# Patient Record
Sex: FEMALE | Race: WHITE | NOT HISPANIC OR LATINO | Employment: UNEMPLOYED | URBAN - NONMETROPOLITAN AREA
[De-identification: names, ages, dates, MRNs, and addresses within clinical notes are randomized per-mention and may not be internally consistent; named-entity substitution may affect disease eponyms.]

---

## 2018-08-30 ENCOUNTER — HOSPITAL ENCOUNTER (OUTPATIENT)
Dept: CT IMAGING | Facility: HOSPITAL | Age: 28
Discharge: HOME | End: 2018-08-30
Attending: UROLOGY | Admitting: UROLOGY
Payer: MEDICAID

## 2018-08-30 DIAGNOSIS — N20.0 KIDNEY STONE: ICD-10-CM

## 2018-08-30 PROCEDURE — 74176 CT ABD & PELVIS W/O CONTRAST: CPT | Mod: TC

## 2018-09-03 ENCOUNTER — APPOINTMENT (OUTPATIENT)
Dept: ULTRASOUND IMAGING | Facility: HOSPITAL | Age: 28
End: 2018-09-03
Attending: PHYSICIAN ASSISTANT
Payer: MEDICAID

## 2018-09-03 ENCOUNTER — HOSPITAL ENCOUNTER (EMERGENCY)
Facility: HOSPITAL | Age: 28
Discharge: HOME | End: 2018-09-03
Attending: PHYSICIAN ASSISTANT | Admitting: PHYSICIAN ASSISTANT
Payer: MEDICAID

## 2018-09-03 VITALS
TEMPERATURE: 98.4 F | HEART RATE: 95 BPM | DIASTOLIC BLOOD PRESSURE: 66 MMHG | OXYGEN SATURATION: 95 % | RESPIRATION RATE: 16 BRPM | SYSTOLIC BLOOD PRESSURE: 105 MMHG

## 2018-09-03 DIAGNOSIS — N73.0 PID (ACUTE PELVIC INFLAMMATORY DISEASE): ICD-10-CM

## 2018-09-03 LAB
ALBUMIN SERPL-MCNC: 3.8 G/DL (ref 3.4–5)
ALBUMIN UR-MCNC: NEGATIVE MG/DL
ALP SERPL-CCNC: 59 U/L (ref 40–150)
ALT SERPL W P-5'-P-CCNC: 27 U/L (ref 0–50)
ANION GAP SERPL CALCULATED.3IONS-SCNC: 11 MMOL/L (ref 3–14)
APPEARANCE UR: CLEAR
AST SERPL W P-5'-P-CCNC: 22 U/L (ref 0–45)
BACTERIA #/AREA URNS HPF: ABNORMAL /HPF
BASOPHILS # BLD AUTO: 0 10E9/L (ref 0–0.2)
BASOPHILS NFR BLD AUTO: 0.2 %
BILIRUB SERPL-MCNC: 0.3 MG/DL (ref 0.2–1.3)
BILIRUB UR QL STRIP: NEGATIVE
BUN SERPL-MCNC: 16 MG/DL (ref 7–30)
C TRACH DNA SPEC QL PROBE+SIG AMP: ABNORMAL
C TRACH DNA SPEC QL PROBE+SIG AMP: NOT DETECTED
CALCIUM SERPL-MCNC: 9.1 MG/DL (ref 8.5–10.1)
CHLORIDE SERPL-SCNC: 103 MMOL/L (ref 94–109)
CO2 SERPL-SCNC: 25 MMOL/L (ref 20–32)
COLOR UR AUTO: ABNORMAL
CREAT SERPL-MCNC: 1.09 MG/DL (ref 0.52–1.04)
CRP SERPL-MCNC: 28.1 MG/L (ref 0–8)
DIFFERENTIAL METHOD BLD: NORMAL
EOSINOPHIL # BLD AUTO: 0 10E9/L (ref 0–0.7)
EOSINOPHIL NFR BLD AUTO: 0.4 %
ERYTHROCYTE [DISTWIDTH] IN BLOOD BY AUTOMATED COUNT: 12.5 % (ref 10–15)
GFR SERPL CREATININE-BSD FRML MDRD: 60 ML/MIN/1.7M2
GLUCOSE SERPL-MCNC: 87 MG/DL (ref 70–99)
GLUCOSE UR STRIP-MCNC: NEGATIVE MG/DL
HCG UR QL: NEGATIVE
HCT VFR BLD AUTO: 41.7 % (ref 35–47)
HGB BLD-MCNC: 14.1 G/DL (ref 11.7–15.7)
HGB UR QL STRIP: ABNORMAL
IMM GRANULOCYTES # BLD: 0 10E9/L (ref 0–0.4)
IMM GRANULOCYTES NFR BLD: 0.2 %
KETONES UR STRIP-MCNC: NEGATIVE MG/DL
LEUKOCYTE ESTERASE UR QL STRIP: NEGATIVE
LYMPHOCYTES # BLD AUTO: 1.4 10E9/L (ref 0.8–5.3)
LYMPHOCYTES NFR BLD AUTO: 14.1 %
MCH RBC QN AUTO: 29.8 PG (ref 26.5–33)
MCHC RBC AUTO-ENTMCNC: 33.8 G/DL (ref 31.5–36.5)
MCV RBC AUTO: 88 FL (ref 78–100)
MONOCYTES # BLD AUTO: 0.6 10E9/L (ref 0–1.3)
MONOCYTES NFR BLD AUTO: 6.4 %
MUCOUS THREADS #/AREA URNS LPF: PRESENT /LPF
N GONORRHOEA DNA SPEC QL PROBE+SIG AMP: ABNORMAL
N GONORRHOEA DNA SPEC QL PROBE+SIG AMP: NOT DETECTED
NEUTROPHILS # BLD AUTO: 7.5 10E9/L (ref 1.6–8.3)
NEUTROPHILS NFR BLD AUTO: 78.7 %
NITRATE UR QL: NEGATIVE
NRBC # BLD AUTO: 0 10*3/UL
NRBC BLD AUTO-RTO: 0 /100
PH UR STRIP: 6.5 PH (ref 4.7–8)
PLATELET # BLD AUTO: 319 10E9/L (ref 150–450)
POTASSIUM SERPL-SCNC: 3.8 MMOL/L (ref 3.4–5.3)
PROT SERPL-MCNC: 8.5 G/DL (ref 6.8–8.8)
RBC # BLD AUTO: 4.73 10E12/L (ref 3.8–5.2)
RBC #/AREA URNS AUTO: 1 /HPF (ref 0–2)
SODIUM SERPL-SCNC: 139 MMOL/L (ref 133–144)
SOURCE: ABNORMAL
SP GR UR STRIP: 1 (ref 1–1.03)
SPECIMEN SOURCE: ABNORMAL
SPECIMEN SOURCE: NORMAL
SPECIMEN SOURCE: NORMAL
UROBILINOGEN UR STRIP-MCNC: NORMAL MG/DL (ref 0–2)
WBC # BLD AUTO: 9.6 10E9/L (ref 4–11)
WBC #/AREA URNS AUTO: <1 /HPF (ref 0–5)
WET PREP SPEC: NORMAL

## 2018-09-03 PROCEDURE — 25000132 ZZH RX MED GY IP 250 OP 250 PS 637: Performed by: PHYSICIAN ASSISTANT

## 2018-09-03 PROCEDURE — 87210 SMEAR WET MOUNT SALINE/INK: CPT | Performed by: PHYSICIAN ASSISTANT

## 2018-09-03 PROCEDURE — 81001 URINALYSIS AUTO W/SCOPE: CPT | Performed by: PHYSICIAN ASSISTANT

## 2018-09-03 PROCEDURE — 80053 COMPREHEN METABOLIC PANEL: CPT | Performed by: PHYSICIAN ASSISTANT

## 2018-09-03 PROCEDURE — 96372 THER/PROPH/DIAG INJ SC/IM: CPT

## 2018-09-03 PROCEDURE — 36415 COLL VENOUS BLD VENIPUNCTURE: CPT | Performed by: PHYSICIAN ASSISTANT

## 2018-09-03 PROCEDURE — 81025 URINE PREGNANCY TEST: CPT | Performed by: PHYSICIAN ASSISTANT

## 2018-09-03 PROCEDURE — 86140 C-REACTIVE PROTEIN: CPT | Performed by: PHYSICIAN ASSISTANT

## 2018-09-03 PROCEDURE — 87591 N.GONORRHOEAE DNA AMP PROB: CPT | Performed by: PHYSICIAN ASSISTANT

## 2018-09-03 PROCEDURE — 76856 US EXAM PELVIC COMPLETE: CPT | Mod: TC

## 2018-09-03 PROCEDURE — 85025 COMPLETE CBC W/AUTO DIFF WBC: CPT | Performed by: PHYSICIAN ASSISTANT

## 2018-09-03 PROCEDURE — 99285 EMERGENCY DEPT VISIT HI MDM: CPT | Mod: 25

## 2018-09-03 PROCEDURE — 25000128 H RX IP 250 OP 636: Performed by: PHYSICIAN ASSISTANT

## 2018-09-03 PROCEDURE — 99285 EMERGENCY DEPT VISIT HI MDM: CPT | Performed by: PHYSICIAN ASSISTANT

## 2018-09-03 PROCEDURE — 25000125 ZZHC RX 250: Performed by: PHYSICIAN ASSISTANT

## 2018-09-03 PROCEDURE — 87491 CHLMYD TRACH DNA AMP PROBE: CPT | Performed by: PHYSICIAN ASSISTANT

## 2018-09-03 RX ORDER — TRAZODONE HYDROCHLORIDE 50 MG/1
100 TABLET, FILM COATED ORAL
COMMUNITY
Start: 2018-07-31

## 2018-09-03 RX ORDER — ACETAMINOPHEN 500 MG
1000 TABLET ORAL
COMMUNITY
Start: 2015-05-29

## 2018-09-03 RX ORDER — GABAPENTIN 300 MG/1
CAPSULE ORAL
COMMUNITY
Start: 2018-08-13

## 2018-09-03 RX ORDER — KETOROLAC TROMETHAMINE 30 MG/ML
60 INJECTION, SOLUTION INTRAMUSCULAR; INTRAVENOUS ONCE
Status: COMPLETED | OUTPATIENT
Start: 2018-09-03 | End: 2018-09-03

## 2018-09-03 RX ORDER — CEFTRIAXONE SODIUM 1 G
250 VIAL (EA) INJECTION ONCE
Status: COMPLETED | OUTPATIENT
Start: 2018-09-03 | End: 2018-09-03

## 2018-09-03 RX ORDER — CLONAZEPAM 0.5 MG/1
0.5 TABLET ORAL
COMMUNITY
Start: 2018-07-31

## 2018-09-03 RX ORDER — DOXYCYCLINE 100 MG/1
100 CAPSULE ORAL 2 TIMES DAILY
Qty: 28 CAPSULE | Refills: 0 | Status: SHIPPED | OUTPATIENT
Start: 2018-09-03 | End: 2018-09-17

## 2018-09-03 RX ORDER — KETOROLAC TROMETHAMINE 10 MG/1
10 TABLET, FILM COATED ORAL EVERY 6 HOURS PRN
Qty: 20 TABLET | Refills: 0 | Status: SHIPPED | OUTPATIENT
Start: 2018-09-03

## 2018-09-03 RX ORDER — NORGESTIMATE AND ETHINYL ESTRADIOL 0.25-0.035
1 KIT ORAL DAILY
COMMUNITY
Start: 2018-07-31

## 2018-09-03 RX ORDER — LIDOCAINE HYDROCHLORIDE 10 MG/ML
INJECTION, SOLUTION EPIDURAL; INFILTRATION; INTRACAUDAL; PERINEURAL
Status: DISCONTINUED
Start: 2018-09-03 | End: 2018-09-03 | Stop reason: HOSPADM

## 2018-09-03 RX ORDER — ONDANSETRON 4 MG/1
4 TABLET, ORALLY DISINTEGRATING ORAL EVERY 8 HOURS PRN
Qty: 10 TABLET | Refills: 0 | Status: SHIPPED | OUTPATIENT
Start: 2018-09-03 | End: 2018-09-06

## 2018-09-03 RX ORDER — OMEPRAZOLE 40 MG/1
40 CAPSULE, DELAYED RELEASE ORAL DAILY
COMMUNITY
Start: 2018-08-01

## 2018-09-03 RX ORDER — ONDANSETRON 4 MG/1
4 TABLET, ORALLY DISINTEGRATING ORAL ONCE
Status: COMPLETED | OUTPATIENT
Start: 2018-09-03 | End: 2018-09-03

## 2018-09-03 RX ORDER — ALBUTEROL SULFATE 90 UG/1
2 AEROSOL, METERED RESPIRATORY (INHALATION) EVERY 4 HOURS PRN
COMMUNITY
Start: 2017-01-03

## 2018-09-03 RX ORDER — OXYBUTYNIN CHLORIDE 5 MG/1
5 TABLET ORAL 2 TIMES DAILY
COMMUNITY
Start: 2018-08-17

## 2018-09-03 RX ORDER — DOXYCYCLINE HYCLATE 100 MG
100 TABLET ORAL ONCE
Status: COMPLETED | OUTPATIENT
Start: 2018-09-03 | End: 2018-09-03

## 2018-09-03 RX ORDER — LISINOPRIL/HYDROCHLOROTHIAZIDE 10-12.5 MG
1 TABLET ORAL DAILY
COMMUNITY
Start: 2018-07-31

## 2018-09-03 RX ADMIN — CEFTRIAXONE 250 MG: 1 INJECTION, POWDER, FOR SOLUTION INTRAMUSCULAR; INTRAVENOUS at 17:13

## 2018-09-03 RX ADMIN — DOXYCYCLINE HYCLATE 100 MG: 100 TABLET, FILM COATED ORAL at 17:11

## 2018-09-03 RX ADMIN — ONDANSETRON 4 MG: 4 TABLET, ORALLY DISINTEGRATING ORAL at 17:11

## 2018-09-03 RX ADMIN — KETOROLAC TROMETHAMINE 60 MG: 30 INJECTION, SOLUTION INTRAMUSCULAR at 17:13

## 2018-09-03 ASSESSMENT — ENCOUNTER SYMPTOMS
SORE THROAT: 0
ABDOMINAL DISTENTION: 0
APPETITE CHANGE: 0
MUSCULOSKELETAL NEGATIVE: 1
DIFFICULTY URINATING: 0
BLOOD IN STOOL: 0
CONSTIPATION: 0
ANAL BLEEDING: 0
DIARRHEA: 0
UNEXPECTED WEIGHT CHANGE: 0
NEUROLOGICAL NEGATIVE: 1
ABDOMINAL PAIN: 0
HEMATURIA: 0
VOMITING: 0
SHORTNESS OF BREATH: 0
CHILLS: 0
DYSURIA: 0
FREQUENCY: 0
FEVER: 0
NAUSEA: 0
FLANK PAIN: 0

## 2018-09-03 NOTE — ED NOTES
Pt to room 6 of the ED by self. Pt is On sprintec birth control, missed one pill, but is supposed to get her period every 3 months. Had breakthrough bleeding at beginning of month and now just randomly bleeding. Pt now reports she is also having red bloody discharge from rectum. Pt does not know if she is pregnant. Pt also gaining unexplainable weight of 50 lbs over the last year, but thinks it might be her birth control. Call light in reach.

## 2018-09-03 NOTE — ED AVS SNAPSHOT
[facility]    [address] [address]    Phone:  [phone]                                       Jeannette Maria   MRN: [id number]    Department:  [facility]   Date of Visit:  9/3/2018           Patient Information     Date Of Birth          1990        Your diagnoses for this visit were:     PID (acute pelvic inflammatory disease)        You were seen by Akiko Montiel PA-C.      Follow-up Information     Follow up with Josie Rivero In 3 days.    Specialty:  Nurse Practitioner    Contact information:    [facility]  [address] [facility] [id number] [phone]          Follow up with [facility].    Specialty:  EMERGENCY MEDICINE    Why:  If symptoms worsen    Contact information:    [address]  [facility] [address] [phone]    Additional information:    From [facility]: Take [address]. Turn left at [address]. Turn left at the first stoplight on [address]. At the first stop sign, take a right onto [address]. Take a left into the parking lot and continue through until you reach the North enterance of the building.       From [place]: Take [address]. Take the [address] ramp towards [place]. Turn left onto [address]. Take a slight right onto [address]. Turn left at the first stoplight on [address]. At the first stop sign, take a right onto [address]. Take a left into the parking lot and continue through until you reach the North enterance of the building.       From [place]: Take [address]. Take a right at [address]. At the first stop sign, take a right onto [address]. Take a left into the parking lot and continue through until you reach the North enterance of the building.         Discharge Instructions       Take the Doxycycline as prescribed for your pelvic infection. You STD testing came back normal. Please  follow up with OB this week for re-check. It is possible you may have endometriosis but this can be very hard to diagnose. Take the Toradol as prescribed for pain. Take the Zofran as prescribed for nausea. Please return here with any new or worsening symptoms.     Treating Pelvic Inflammatory Disease (PID) with Medicines     Be sure to take your antibiotics every day until they re finished.   As soon as pelvic inflammatory disease (PID) has been diagnosed, it needs to be treated with antibiotics. Two or more types of antibiotics may be taken at the same time. This ensures that all the bacteria are killed. It's very important to take all of your medicine as prescribed, or the infection may not go away.  For mild cases  Mild cases of PID can be treated at home. Antibiotic pills will likely be prescribed. You may also get an injection of antibiotics.  For more serious cases  Severe cases of PID need to be treated in the hospital. There, you will get antibiotics through an IV (intravenous) line. Your health will be closely watched. The length of your hospital stay depends on how sick you are. After you leave the hospital, antibiotic pills will likely be prescribed. If complications have occurred, you may need surgery to help treat them.  Make sure your partner gets treated  The bacteria that cause PID can also have harmful effects in men. Any partner you have had sex with in the past 60 days should be treated for chlamydia and gonorrhea. Your partner must be treated with antibiotics as well.  During treatment  Follow all your healthcare provider s instructions. This will help you heal. During treatment:    Finish all of your antibiotics, even if you start to feel better. Otherwise, the infection might not go away. It could even get worse and become harder to treat.    Don t have sex until both you and your partner have finished all of your antibiotics.    Relieve pelvic pain with a heating pad, hot water bottle, or ice  pack. Your healthcare provider may also suggest a prescription or over-the-counter pain medicine.    Ask your healthcare provider if you should avoid alcohol, which can react with some antibiotics.  If you take birth control pills  When both you and your partner have finished your antibiotics, it s OK to have sex. Use latex condoms to prevent future infections. Also, keep taking your birth control pills on schedule. Remember: Birth control pills help prevent pregnancy, but do not protect against sexually transmitted infections (STIs).  If the infection returns  Even after treatment, PID can come back. This could happen if you re infected by another STI. But be aware that once you ve had PID, bacteria that are normally harmless may be more likely to infect your upper genital tract. This means you could get PID again even without getting another STI. With each PID infection, the chances of complications go up.  When to call your healthcare provider  While you re being treated for PID, call your healthcare provider if you have any of the following:    A fever of 100.4 F (38 C) or higher, or as directed by your healthcare provider    Worsening pelvic pain    Vomiting    A rash    Severe diarrhea   Date Last Reviewed: 10/1/2017    7648-2091 [employer]. [address] [place] [address]. All rights reserved. This information is not intended as a substitute for professional medical care. Always follow your healthcare professional's instructions.             Review of your medicines      START taking        Dose / Directions Last dose taken    doxycycline 100 MG capsule   Commonly known as:  VIBRAMYCIN   Dose:  100 mg   Quantity:  28 capsule        Take 1 capsule (100 mg) by mouth 2 times daily for 14 days   Refills:  0        ketorolac 10 MG tablet   Commonly known as:  TORADOL   Dose:  10 mg   Quantity:  20 tablet        Take 1 tablet (10 mg) by mouth every 6 hours as needed for moderate pain    Refills:  0        ondansetron 4 MG ODT tab   Commonly known as:  ZOFRAN ODT   Dose:  4 mg   Quantity:  10 tablet        Take 1 tablet (4 mg) by mouth every 8 hours as needed for nausea   Refills:  0          Our records show that you are taking the medicines listed below. If these are incorrect, please call your family doctor or clinic.        Dose / Directions Last dose taken    acetaminophen 500 MG tablet   Commonly known as:  TYLENOL   Dose:  1000 mg        Take 1,000 mg by mouth   Refills:  0        albuterol 108 (90 Base) MCG/ACT inhaler   Commonly known as:  PROAIR HFA/PROVENTIL HFA/VENTOLIN HFA   Dose:  2 puff        2 puffs every 4 hours as needed   Refills:  0        clonazePAM 0.5 MG tablet   Commonly known as:  klonoPIN   Dose:  0.5 mg        Take 0.5 mg by mouth   Refills:  0        gabapentin 300 MG capsule   Commonly known as:  NEURONTIN        Take two tablets in the AM, one in the afternoon and three at bedtime   Refills:  0        lisinopril-hydrochlorothiazide 10-12.5 MG per tablet   Commonly known as:  PRINZIDE/ZESTORETIC   Dose:  1 tablet        Take 1 tablet by mouth daily   Refills:  0        norgestimate-ethinyl estradiol 0.25-35 MG-MCG per tablet   Commonly known as:  ORTHO-CYCLEN, SPRINTEC   Dose:  1 tablet        Take 1 tablet by mouth daily   Refills:  0        omeprazole 40 MG capsule   Commonly known as:  priLOSEC   Dose:  40 mg        Take 40 mg by mouth daily   Refills:  0        oxybutynin 5 MG tablet   Commonly known as:  DITROPAN   Dose:  5 mg        Take 5 mg by mouth 2 times daily   Refills:  0        traZODone 50 MG tablet   Commonly known as:  DESYREL   Dose:  100 mg        Take 100 mg by mouth nightly as needed   Refills:  0                Prescriptions were sent or printed at these locations (3 Prescriptions)                   [id number] - [place] - [address] [address] [facility] [address] [address] [place] [address]    Telephone:  [phone]   Fax:  [phone]   Hours:                   E-Prescribed (3 of 3)         doxycycline (VIBRAMYCIN) 100 MG capsule               ketorolac (TORADOL) 10 MG tablet               ondansetron (ZOFRAN ODT) 4 MG ODT tab                Procedures and tests performed during your visit     Procedure/Test Number of Times Performed    CBC with platelets differential 1    CRP inflammation 1    Comprehensive metabolic panel 1    GC/Chlamydia by PCR - [facility] 2    HCG qualitative urine 1    UA reflex to Microscopic and Culture 1    US Pelvic Complete with Transvaginal 1    Wet prep 1      Orders Needing Specimen Collection     None      Pending Results     No orders found from 9/1/2018 to 9/4/2018.            Pending Culture Results     No orders found from 9/1/2018 to 9/4/2018.            Thank you for choosing [facility]       Thank you for choosing [facility] for your care. Our goal is always to provide you with excellent care. Hearing back from our patients is one way we can continue to improve our services. Please take a few minutes to complete the written survey that you may receive in the mail after you visit with us. Thank you!        Care EveryWhere ID     This is your Care EveryWhere ID. This could be used by other organizations to access your [facility] medical records  [id number]        Equal Access to Services     CHARLY LAUREN : Hadazar Abraham, cade anton, kiesha dozier. So [facility] [phone].    ATENCIÓN: Si habla español, tiene a galvan disposición servicios gratuitos de asistencia lingüística. Llame al [phone].    We comply with applicable federal civil rights laws and [place] laws. We do not discriminate on the basis of race, color, national origin, age, disability, sex, sexual orientation, or gender identity.            After Visit Summary       This is your record. Keep this with you and show to your community pharmacist(s) and doctor(s) at your next visit.

## 2018-09-03 NOTE — ED NOTES
"Patient presents today with concerns she may be pregnant.  Patient states she had what she thought was a normal period in July and then her August she had \"break through\" bleeding and different then normal period.  Patient states she takes the pill and swears she only missed one tablet of BC.  Also notes she has been having rectal bleeding unsure of duration.  Also states she has lower abdominal pain cramping/pressure sensation.  States \"I don't have money to buy a pregnancy test\"  "

## 2018-09-03 NOTE — ED PROVIDER NOTES
History     Chief Complaint   Patient presents with     Vaginal Bleeding     Rectal Bleeding     HPI  Jeannette Maria is a 28 year old female who presents with light vaginal bleeding and lower abdominal cramping x 1 month. LMP August 1st. She takes Ortho-trycyclen birth control regularly. She is sexually active with one partner and does not use condoms. She has family h/o endometriosis and is wondering if she may have this. She is also concerned she may be pregnant but could not afford a pregnancy test.     Problem List:    There are no active problems to display for this patient.       Past Medical History:    History reviewed. No pertinent past medical history.    Past Surgical History:    History reviewed. No pertinent surgical history.    Family History:    No family history on file.    Social History:  Marital Status:  Single [1]  Social History   Substance Use Topics     Smoking status: Current Every Day Smoker     Packs/day: 0.50     Smokeless tobacco: Never Used     Alcohol use Yes      Comment: occasional        Medications:      acetaminophen (TYLENOL) 500 MG tablet   clonazePAM (KLONOPIN) 0.5 MG tablet   doxycycline (VIBRAMYCIN) 100 MG capsule   gabapentin (NEURONTIN) 300 MG capsule   ketorolac (TORADOL) 10 MG tablet   lisinopril-hydrochlorothiazide (PRINZIDE/ZESTORETIC) 10-12.5 MG per tablet   norgestimate-ethinyl estradiol (ORTHO-CYCLEN, SPRINTEC) 0.25-35 MG-MCG per tablet   omeprazole (PRILOSEC) 40 MG capsule   ondansetron (ZOFRAN ODT) 4 MG ODT tab   oxybutynin (DITROPAN) 5 MG tablet   traZODone (DESYREL) 50 MG tablet   albuterol (PROAIR HFA/PROVENTIL HFA/VENTOLIN HFA) 108 (90 Base) MCG/ACT inhaler         Review of Systems   Constitutional: Negative for appetite change, chills, fever and unexpected weight change.   HENT: Negative for sore throat.    Respiratory: Negative for shortness of breath.    Cardiovascular: Negative for chest pain.   Gastrointestinal: Negative for abdominal distention,  abdominal pain, anal bleeding, blood in stool, constipation, diarrhea, nausea and vomiting.   Genitourinary: Positive for pelvic pain and vaginal bleeding. Negative for difficulty urinating, dyspareunia, dysuria, flank pain, frequency, genital sores, hematuria, urgency, vaginal discharge and vaginal pain.   Musculoskeletal: Negative.    Skin: Negative.    Neurological: Negative.    All other systems reviewed and are negative.      Physical Exam   BP: 140/62  Pulse: 95  Heart Rate: 98  Temp: 99.5  F (37.5  C)  Resp: 18  SpO2: 99 %      Physical Exam   Constitutional: She is oriented to person, place, and time. Vital signs are normal. She appears well-developed and well-nourished.  Non-toxic appearance. She does not have a sickly appearance. She does not appear ill. No distress.   HENT:   Head: Normocephalic and atraumatic.   Right Ear: External ear normal.   Left Ear: External ear normal.   Nose: Nose normal.   Mouth/Throat: Oropharynx is clear and moist. No oropharyngeal exudate.   Eyes: Conjunctivae are normal. Pupils are equal, round, and reactive to light. Right eye exhibits no discharge. Left eye exhibits no discharge. No scleral icterus.   Neck: Normal range of motion. Neck supple.   Cardiovascular: Normal rate, regular rhythm, normal heart sounds and intact distal pulses.  Exam reveals no gallop and no friction rub.    No murmur heard.  Pulmonary/Chest: Effort normal and breath sounds normal. No respiratory distress. She has no wheezes. She has no rales. She exhibits no tenderness.   Abdominal: Soft. Bowel sounds are normal. She exhibits no distension and no mass. There is generalized tenderness. There is no rebound and no guarding. Hernia confirmed negative in the right inguinal area and confirmed negative in the left inguinal area.   Genitourinary: Rectum normal. There is no rash, tenderness, lesion or injury on the right labia. There is no rash, tenderness, lesion or injury on the left labia. Uterus is  tender. Uterus is not deviated, not enlarged and not fixed. Cervix exhibits motion tenderness. Cervix exhibits no discharge and no friability. Right adnexum displays tenderness. Left adnexum displays tenderness. There is bleeding (Mild, dark brown. ) in the vagina.   Musculoskeletal: Normal range of motion. She exhibits no edema.   Lymphadenopathy:     She has no cervical adenopathy.        Right: No inguinal adenopathy present.        Left: No inguinal adenopathy present.   Neurological: She is alert and oriented to person, place, and time. No cranial nerve deficit.   Skin: Skin is warm and dry. No rash noted. She is not diaphoretic. No erythema. No pallor.   Psychiatric: She has a normal mood and affect. Her behavior is normal. Judgment and thought content normal.   Nursing note and vitals reviewed.      ED Course     ED Course     Procedures            Results for orders placed or performed during the hospital encounter of 09/03/18 (from the past 24 hour(s))   HCG qualitative urine   Result Value Ref Range    HCG Qual Urine Negative NEG^Negative   UA reflex to Microscopic and Culture   Result Value Ref Range    Color Urine Straw     Appearance Urine Clear     Glucose Urine Negative NEG^Negative mg/dL    Bilirubin Urine Negative NEG^Negative    Ketones Urine Negative NEG^Negative mg/dL    Specific Gravity Urine 1.003 1.003 - 1.035    Blood Urine Moderate (A) NEG^Negative    pH Urine 6.5 4.7 - 8.0 pH    Protein Albumin Urine Negative NEG^Negative mg/dL    Urobilinogen mg/dL Normal 0.0 - 2.0 mg/dL    Nitrite Urine Negative NEG^Negative    Leukocyte Esterase Urine Negative NEG^Negative    Source Midstream Urine     RBC Urine 1 0 - 2 /HPF    WBC Urine <1 0 - 5 /HPF    Bacteria Urine None (A) NEG^Negative /HPF    Mucous Urine Present (A) NEG^Negative /LPF   GC/Chlamydia by PCR - [facility]   Result Value Ref Range    Specimen Source Urine     Neisseria gonorrhoreae PCR Incorrect specimen type (A) NDET^Not Detected     Chlamydia Trachomatis PCR Incorrect specimen type (A) NDET^Not Detected   CBC with platelets differential   Result Value Ref Range    WBC 9.6 4.0 - 11.0 10e9/L    RBC Count 4.73 3.8 - 5.2 10e12/L    Hemoglobin 14.1 11.7 - 15.7 g/dL    Hematocrit 41.7 35.0 - 47.0 %    MCV 88 78 - 100 fl    MCH 29.8 26.5 - 33.0 pg    MCHC 33.8 31.5 - 36.5 g/dL    RDW 12.5 10.0 - 15.0 %    Platelet Count 319 150 - 450 10e9/L    Diff Method Automated Method     % Neutrophils 78.7 %    % Lymphocytes 14.1 %    % Monocytes 6.4 %    % Eosinophils 0.4 %    % Basophils 0.2 %    % Immature Granulocytes 0.2 %    Nucleated RBCs 0 0 /100    Absolute Neutrophil 7.5 1.6 - 8.3 10e9/L    Absolute Lymphocytes 1.4 0.8 - 5.3 10e9/L    Absolute Monocytes 0.6 0.0 - 1.3 10e9/L    Absolute Eosinophils 0.0 0.0 - 0.7 10e9/L    Absolute Basophils 0.0 0.0 - 0.2 10e9/L    Abs Immature Granulocytes 0.0 0 - 0.4 10e9/L    Absolute Nucleated RBC 0.0    Comprehensive metabolic panel   Result Value Ref Range    Sodium 139 133 - 144 mmol/L    Potassium 3.8 3.4 - 5.3 mmol/L    Chloride 103 94 - 109 mmol/L    Carbon Dioxide 25 20 - 32 mmol/L    Anion Gap 11 3 - 14 mmol/L    Glucose 87 70 - 99 mg/dL    Urea Nitrogen 16 7 - 30 mg/dL    Creatinine 1.09 (H) 0.52 - 1.04 mg/dL    GFR Estimate 60 (L) >60 mL/min/1.7m2    GFR Estimate If Black 73 >60 mL/min/1.7m2    Calcium 9.1 8.5 - 10.1 mg/dL    Bilirubin Total 0.3 0.2 - 1.3 mg/dL    Albumin 3.8 3.4 - 5.0 g/dL    Protein Total 8.5 6.8 - 8.8 g/dL    Alkaline Phosphatase 59 40 - 150 U/L    ALT 27 0 - 50 U/L    AST 22 0 - 45 U/L   CRP inflammation   Result Value Ref Range    CRP Inflammation 28.1 (H) 0.0 - 8.0 mg/L   Wet prep   Result Value Ref Range    Specimen Description Vagina     Wet Prep Few  WBC'S seen       Wet Prep No Trichomonas seen     Wet Prep No clue cells seen     Wet Prep No yeast seen    GC/Chlamydia by PCR - [facility]   Result Value Ref Range    Specimen Source Endocervical     Neisseria gonorrhoreae PCR Not Detected  NDET^Not Detected    Chlamydia Trachomatis PCR Not Detected NDET^Not Detected   US Pelvic Complete with Transvaginal    Narrative    US PELVIC COMPLETE WITH TRANSVAGINAL    HISTORY: 28 years Female pelvic pain;     TECHNIQUE: Transabdominal and transvaginal ultrasonography of the  pelvis was performed.    COMPARISON: None    FINDINGS:    The uterus measures 7.1 x 2.8 x 3.9 cm.    Endometrial thickness is 2 mm.    The right ovary is 2.7 x 1.4 x 2.9 cm.     The left ovary is 3.6 x 1.6 x 2.0 cm.     Doppler flow is visible in both ovaries. There is no evidence of  torsion.    No adnexal masses or abnormal fluid collection is present      Impression    IMPRESSION: Negative study.    MARILUZ CARDONA MD       Medications   lidocaine (PF) (XYLOCAINE) 1 % injection (not administered)   ketorolac (TORADOL) injection 60 mg (60 mg Intramuscular Given 9/3/18 1713)   ondansetron (ZOFRAN-ODT) ODT tab 4 mg (4 mg Oral Given 9/3/18 1711)   cefTRIAXone (ROCEPHIN) injection 250 mg (250 mg Intramuscular Given 9/3/18 1713)   doxycycline (VIBRA-TABS) tablet 100 mg (100 mg Oral Given 9/3/18 1711)       Assessments & Plan (with Medical Decision Making)   Jeannette presents with 1 month h/o light vaginal bleeding and cramping. UA negative for infection and HCG negative. Labs unremarkable other than elevated CRP of 28. She had positive CMT on exam and light vaginal bleeding. She was also tender over the uterus and bilateral ovaries, left > right. Pelvic US was also very uncomfortable for her and was unrevealing, normal study. Given the amount of tenderness on exam, will cover her for PID. KOH negative as well as GC/Chlamydia. Rocephin 250mg IM was given as well as first dose of doxy 100mg PO with RX for Doxy 100mg BID x 14 days sent. She was given Toradol 60mg IM for pain with good relief following. Toradol prescribed for pain at home as well. Suggested she f/u with OB for re-check this week and to discuss if further work up for  endometriosis if warranted.     Plan: Take the Doxycycline as prescribed for your pelvic infection. You STD testing came back normal. Please follow up with OB this week for re-check. It is possible you may have endometriosis but this can be very hard to diagnose. Take the Toradol as prescribed for pain. Take the Zofran as prescribed for nausea. Please return here with any new or worsening symptoms.     I have reviewed the nursing notes.    I have reviewed the findings, diagnosis, plan and need for follow up with the patient.    Discharge Medication List as of 9/3/2018  5:29 PM      START taking these medications    Details   doxycycline (VIBRAMYCIN) 100 MG capsule Take 1 capsule (100 mg) by mouth 2 times daily for 14 days, Disp-28 capsule, R-0, E-Prescribe      ketorolac (TORADOL) 10 MG tablet Take 1 tablet (10 mg) by mouth every 6 hours as needed for moderate pain, Disp-20 tablet, R-0, E-Prescribe      ondansetron (ZOFRAN ODT) 4 MG ODT tab Take 1 tablet (4 mg) by mouth every 8 hours as needed for nausea, Disp-10 tablet, R-0, E-Prescribe             Final diagnoses:   PID (acute pelvic inflammatory disease)       9/3/2018   [facility]     Akiko Montiel PA-C  09/03/18 [id number]

## 2018-09-03 NOTE — DISCHARGE INSTRUCTIONS
Take the Doxycycline as prescribed for your pelvic infection. You STD testing came back normal. Please follow up with OB this week for re-check. It is possible you may have endometriosis but this can be very hard to diagnose. Take the Toradol as prescribed for pain. Take the Zofran as prescribed for nausea. Please return here with any new or worsening symptoms.     Treating Pelvic Inflammatory Disease (PID) with Medicines     Be sure to take your antibiotics every day until they re finished.   As soon as pelvic inflammatory disease (PID) has been diagnosed, it needs to be treated with antibiotics. Two or more types of antibiotics may be taken at the same time. This ensures that all the bacteria are killed. It's very important to take all of your medicine as prescribed, or the infection may not go away.  For mild cases  Mild cases of PID can be treated at home. Antibiotic pills will likely be prescribed. You may also get an injection of antibiotics.  For more serious cases  Severe cases of PID need to be treated in the hospital. There, you will get antibiotics through an IV (intravenous) line. Your health will be closely watched. The length of your hospital stay depends on how sick you are. After you leave the hospital, antibiotic pills will likely be prescribed. If complications have occurred, you may need surgery to help treat them.  Make sure your partner gets treated  The bacteria that cause PID can also have harmful effects in men. Any partner you have had sex with in the past 60 days should be treated for chlamydia and gonorrhea. Your partner must be treated with antibiotics as well.  During treatment  Follow all your healthcare provider s instructions. This will help you heal. During treatment:    Finish all of your antibiotics, even if you start to feel better. Otherwise, the infection might not go away. It could even get worse and become harder to treat.    Don t have sex until both you and your partner have  finished all of your antibiotics.    Relieve pelvic pain with a heating pad, hot water bottle, or ice pack. Your healthcare provider may also suggest a prescription or over-the-counter pain medicine.    Ask your healthcare provider if you should avoid alcohol, which can react with some antibiotics.  If you take birth control pills  When both you and your partner have finished your antibiotics, it s OK to have sex. Use latex condoms to prevent future infections. Also, keep taking your birth control pills on schedule. Remember: Birth control pills help prevent pregnancy, but do not protect against sexually transmitted infections (STIs).  If the infection returns  Even after treatment, PID can come back. This could happen if you re infected by another STI. But be aware that once you ve had PID, bacteria that are normally harmless may be more likely to infect your upper genital tract. This means you could get PID again even without getting another STI. With each PID infection, the chances of complications go up.  When to call your healthcare provider  While you re being treated for PID, call your healthcare provider if you have any of the following:    A fever of 100.4 F (38 C) or higher, or as directed by your healthcare provider    Worsening pelvic pain    Vomiting    A rash    Severe diarrhea   Date Last Reviewed: 10/1/2017    9803-3051 [employer]. [address] [place] [address], [place] [address]. All rights reserved. This information is not intended as a substitute for professional medical care. Always follow your healthcare professional's instructions.

## 2018-09-03 NOTE — ED AVS SNAPSHOT
[facility]    [address] [place] [id number]    Phone:  [phone]                                       Jeannette Maria   MRN: [id number]    Department:  [facility]   Date of Visit:  9/3/2018           After Visit Summary Signature Page     I have received my discharge instructions, and my questions have been answered. I have discussed any challenges I see with this plan with the nurse or doctor.    ..........................................................................................................................................  Patient/Patient Representative Signature      ..........................................................................................................................................  Patient Representative Print Name and Relationship to Patient    ..................................................               ................................................  Date                                            Time    ..........................................................................................................................................  Reviewed by Signature/Title    ...................................................              ..............................................  Date                                                            Time          22EPIC Rev 08/18

## 2022-11-01 ENCOUNTER — TRANSFERRED RECORDS (OUTPATIENT)
Dept: HEALTH INFORMATION MANAGEMENT | Facility: CLINIC | Age: 32
End: 2022-11-01

## 2023-02-09 ENCOUNTER — TRANSFERRED RECORDS (OUTPATIENT)
Dept: HEALTH INFORMATION MANAGEMENT | Facility: CLINIC | Age: 33
End: 2023-02-09

## 2023-02-09 DIAGNOSIS — Z32.01 PREGNANCY TEST POSITIVE: Primary | ICD-10-CM

## 2023-02-16 DIAGNOSIS — Z32.01 PREGNANCY TEST POSITIVE: Primary | ICD-10-CM

## 2023-02-17 PROBLEM — N18.30 CKD (CHRONIC KIDNEY DISEASE), STAGE III (H): Status: ACTIVE | Noted: 2018-07-31

## 2023-02-17 PROBLEM — G43.119 INTRACTABLE MIGRAINE WITH AURA WITHOUT STATUS MIGRAINOSUS: Status: ACTIVE | Noted: 2018-07-31

## 2023-02-17 PROBLEM — F33.42 RECURRENT MAJOR DEPRESSIVE DISORDER, IN FULL REMISSION (H): Status: ACTIVE | Noted: 2018-07-31

## 2023-02-17 PROBLEM — K21.9 GASTROESOPHAGEAL REFLUX DISEASE: Status: ACTIVE | Noted: 2018-07-31

## 2023-02-17 PROBLEM — N94.6 DYSMENORRHEA: Status: ACTIVE | Noted: 2019-05-08

## 2023-02-17 PROBLEM — E88.819 PREGNANCY COMPLICATED BY INSULIN RESISTANCE: Status: ACTIVE | Noted: 2021-02-28

## 2023-02-17 PROBLEM — O26.899 PREGNANCY COMPLICATED BY INSULIN RESISTANCE: Status: ACTIVE | Noted: 2021-02-28

## 2023-02-17 PROBLEM — I10 ESSENTIAL HYPERTENSION: Status: ACTIVE | Noted: 2018-11-08

## 2023-02-17 PROBLEM — O10.919 PREEXISTING HYPERTENSION COMPLICATING PREGNANCY, ANTEPARTUM: Status: ACTIVE | Noted: 2021-02-28

## 2023-02-17 PROBLEM — J45.30 MILD PERSISTENT ASTHMA, UNSPECIFIED WHETHER COMPLICATED: Status: ACTIVE | Noted: 2018-07-31

## 2023-02-17 PROBLEM — N13.70 VESICOURETERAL REFLUX, BILATERAL: Status: ACTIVE | Noted: 2019-01-03

## 2023-02-17 PROBLEM — R10.2 PELVIC PAIN IN FEMALE: Status: ACTIVE | Noted: 2019-05-08

## 2023-02-17 PROBLEM — J45.40 MODERATE PERSISTENT CHRONIC ASTHMA WITHOUT COMPLICATION: Status: ACTIVE | Noted: 2017-01-03

## 2023-02-17 RX ORDER — BUPROPION HYDROCHLORIDE 300 MG/1
300 TABLET ORAL
COMMUNITY
Start: 2022-12-15

## 2023-02-28 ENCOUNTER — TRANSFERRED RECORDS (OUTPATIENT)
Dept: HEALTH INFORMATION MANAGEMENT | Facility: CLINIC | Age: 33
End: 2023-02-28